# Patient Record
Sex: MALE | Race: WHITE | Employment: FULL TIME | URBAN - METROPOLITAN AREA
[De-identification: names, ages, dates, MRNs, and addresses within clinical notes are randomized per-mention and may not be internally consistent; named-entity substitution may affect disease eponyms.]

---

## 2024-10-22 ENCOUNTER — HOSPITAL ENCOUNTER (EMERGENCY)
Age: 29
Discharge: HOME | End: 2024-10-22
Attending: EMERGENCY MEDICINE
Payer: COMMERCIAL

## 2024-10-22 VITALS
RESPIRATION RATE: 16 BRPM | HEART RATE: 76 BPM | HEIGHT: 72 IN | BODY MASS INDEX: 21.17 KG/M2 | SYSTOLIC BLOOD PRESSURE: 146 MMHG | OXYGEN SATURATION: 100 % | WEIGHT: 156.3 LBS | DIASTOLIC BLOOD PRESSURE: 84 MMHG | TEMPERATURE: 98.7 F

## 2024-10-22 DIAGNOSIS — S61.219A LACERATION OF FINGER OF RIGHT HAND WITHOUT FOREIGN BODY WITHOUT DAMAGE TO NAIL, UNSPECIFIED FINGER, INITIAL ENCOUNTER: Primary | ICD-10-CM

## 2024-10-22 PROCEDURE — 6360000002 HC RX W HCPCS: Performed by: EMERGENCY MEDICINE

## 2024-10-22 PROCEDURE — 90715 TDAP VACCINE 7 YRS/> IM: CPT | Performed by: EMERGENCY MEDICINE

## 2024-10-22 PROCEDURE — 2500000003 HC RX 250 WO HCPCS: Performed by: EMERGENCY MEDICINE

## 2024-10-22 PROCEDURE — 12002 RPR S/N/AX/GEN/TRNK2.6-7.5CM: CPT

## 2024-10-22 PROCEDURE — 90471 IMMUNIZATION ADMIN: CPT | Performed by: EMERGENCY MEDICINE

## 2024-10-22 PROCEDURE — 99284 EMERGENCY DEPT VISIT MOD MDM: CPT

## 2024-10-22 PROCEDURE — 6370000000 HC RX 637 (ALT 250 FOR IP): Performed by: EMERGENCY MEDICINE

## 2024-10-22 RX ORDER — LIDOCAINE HYDROCHLORIDE AND EPINEPHRINE 10; 10 MG/ML; UG/ML
20 INJECTION, SOLUTION INFILTRATION; PERINEURAL ONCE
Status: COMPLETED | OUTPATIENT
Start: 2024-10-22 | End: 2024-10-22

## 2024-10-22 RX ORDER — BACITRACIN ZINC AND POLYMYXIN B SULFATE 500; 1000 [USP'U]/G; [USP'U]/G
OINTMENT TOPICAL ONCE
Status: COMPLETED | OUTPATIENT
Start: 2024-10-22 | End: 2024-10-22

## 2024-10-22 RX ADMIN — IBUPROFEN 600 MG: 200 TABLET, FILM COATED ORAL at 22:25

## 2024-10-22 RX ADMIN — Medication 1 G: at 22:26

## 2024-10-22 RX ADMIN — TETANUS TOXOID, REDUCED DIPHTHERIA TOXOID AND ACELLULAR PERTUSSIS VACCINE, ADSORBED 0.5 ML: 5; 2.5; 8; 8; 2.5 SUSPENSION INTRAMUSCULAR at 20:09

## 2024-10-22 RX ADMIN — LIDOCAINE HYDROCHLORIDE,EPINEPHRINE BITARTRATE 20 ML: 10; .01 INJECTION, SOLUTION INFILTRATION; PERINEURAL at 20:12

## 2024-10-22 ASSESSMENT — LIFESTYLE VARIABLES
HOW OFTEN DO YOU HAVE A DRINK CONTAINING ALCOHOL: 2-3 TIMES A WEEK
HOW MANY STANDARD DRINKS CONTAINING ALCOHOL DO YOU HAVE ON A TYPICAL DAY: 1 OR 2

## 2024-10-22 ASSESSMENT — PAIN SCALES - GENERAL: PAINLEVEL_OUTOF10: 6

## 2024-10-22 ASSESSMENT — PAIN DESCRIPTION - LOCATION: LOCATION: FINGER (COMMENT WHICH ONE)

## 2024-10-22 ASSESSMENT — PAIN DESCRIPTION - DESCRIPTORS: DESCRIPTORS: NUMBNESS

## 2024-10-22 ASSESSMENT — PAIN - FUNCTIONAL ASSESSMENT: PAIN_FUNCTIONAL_ASSESSMENT: 0-10

## 2024-10-22 ASSESSMENT — PAIN DESCRIPTION - ORIENTATION: ORIENTATION: RIGHT

## 2024-10-22 NOTE — ED PROVIDER NOTES
THE [facility]  EMERGENCY DEPARTMENT ENCOUNTER          ATTENDING PHYSICIAN NOTE       Date of evaluation: 10/22/2024    Chief Complaint     Finger Injury (Pt was cutting a pumpkin when he cut 3 of his fingers on his right hand with a steak knife)      History of Present Illness     Braulio Aldana is a 29 y.o. male who presents to the emergency department today with a laceration of his right hand.  Patient was carving a pumpkin lost control of his knife and lacerated the finger pads of his right third fourth and fifth digit.  Patient is right-hand dominant cannot recall his last tetanus shot    Review of Systems     Review of Systems  All systems were reviewed with the patient/family and negative except as otherwise documented in the HPI.      Past Medical, Surgical, Family, and Social History     He has no past medical history on file.  He has no past surgical history on file.  His family history is not on file.  He reports that he has never smoked. He does not have any smokeless tobacco history on file. He reports current alcohol use of about 2.0 standard drinks of alcohol per week. He reports current drug use. Drug: Marijuana (Weed).    Medications     There are no discharge medications for this patient.      Allergies     He has No Known Allergies.    Physical Exam     INITIAL VITALS: BP: (!) 158/103, Temp: 98.7 °F (37.1 °C), Pulse: 76, Respirations: 16, SpO2: 100 %   Physical Exam  Constitutional: Well-nourished young male no distress  Eyes:  Sclera anicteric.  HEENT:  Normocephalic, oropharynx moist.   Neck: normal range of motion, supple.  Respiratory:  Even and non-labored, no distress   Cardiovascular:  Extremities warm, well perfused  GI:  Soft, nondistended   Musculoskeletal:  No edema, no deformities.   Skin:  No rash, no lesions, no pallor.  Lacerations over the 3rd through 4th finger pads, extend into the subcu tissues no tendon or vascular involvement.  No exposed bone no gross contamination or

## 2024-10-23 NOTE — ED NOTES
Pt discharged from ED in stable condition. Discharge instruction explain, all question answered. Prescription given. Pt walked to lobby independently.       Ran Lenz, TAN  10/22/24 [id number]